# Patient Record
Sex: MALE | Race: WHITE
[De-identification: names, ages, dates, MRNs, and addresses within clinical notes are randomized per-mention and may not be internally consistent; named-entity substitution may affect disease eponyms.]

---

## 2017-01-23 ENCOUNTER — HOSPITAL ENCOUNTER (OUTPATIENT)
Dept: HOSPITAL 80 - BMCIMAGING | Age: 82
End: 2017-01-23
Attending: EMERGENCY MEDICINE
Payer: COMMERCIAL

## 2017-01-23 DIAGNOSIS — M51.36: ICD-10-CM

## 2017-01-23 DIAGNOSIS — M25.551: Primary | ICD-10-CM
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
## 2017-01-23 DIAGNOSIS — M19.011: ICD-10-CM

## 2017-01-23 NOTE — DX
Right shoulder 3 views



History: Pain, fall 3 months ago.



Comparison: None available.



Findings: Old healed right posterolateral fourth rib fracture is noted. Moderate acromioclavicular ar
throsis is present. Alignment of the glenohumeral joint is normal. The acromioclavicular and coracocl
avicular relationships are normal.



Impression:

1. Moderate acromioclavicular arthrosis.

2. Old right fourth rib fracture.

## 2017-01-23 NOTE — DX
Right hip 2 views



History: Fall 3 weeks ago, pain.



Comparison: Lumbar spine November 12, 2014. 



Findings: Bilateral hip arthroplasties are present without evidence of loosening. There is lucency th
rough hypertrophic change in the region of the right greater trochanter, which could be related to fr
acture or heterotopic ossification. The greater trochanter was outside the field-of-view on the noelle
rison MR lumbar spine. Alignment is normal. Severe degenerative change is present in the visualized l
umbar spine. Surgical clips overlie the inferior pelvis.



Impression:

1. Lucency through the right greater trochanter, which may be related to fracture or heterotopic ossi
fication, with normal appearance of the right hip arthroplasty.

2. Severe degenerative change in the lumbar spine.

## 2018-10-11 ENCOUNTER — HOSPITAL ENCOUNTER (OUTPATIENT)
Dept: HOSPITAL 80 - FIMAGING | Age: 83
End: 2018-10-11
Attending: FAMILY MEDICINE
Payer: COMMERCIAL

## 2018-10-11 DIAGNOSIS — H61.23: ICD-10-CM

## 2018-10-11 DIAGNOSIS — R90.82: ICD-10-CM

## 2018-10-11 DIAGNOSIS — G31.9: Primary | ICD-10-CM

## 2018-10-11 PROCEDURE — 70470 CT HEAD/BRAIN W/O & W/DYE: CPT

## 2019-01-25 ENCOUNTER — HOSPITAL ENCOUNTER (EMERGENCY)
Dept: HOSPITAL 80 - FED | Age: 84
Discharge: HOME | End: 2019-01-25
Payer: COMMERCIAL

## 2019-01-25 VITALS — DIASTOLIC BLOOD PRESSURE: 81 MMHG | SYSTOLIC BLOOD PRESSURE: 149 MMHG

## 2019-01-25 DIAGNOSIS — S62.666A: Primary | ICD-10-CM

## 2019-01-25 DIAGNOSIS — S06.9X0A: ICD-10-CM
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
## 2019-01-25 DIAGNOSIS — I10: ICD-10-CM

## 2019-01-25 DIAGNOSIS — I25.2: ICD-10-CM

## 2019-01-25 DIAGNOSIS — Z79.01: ICD-10-CM
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
## 2019-01-25 PROCEDURE — 70450 CT HEAD/BRAIN W/O DYE: CPT

## 2019-01-25 PROCEDURE — L3925 FO PIP DIP JNT/SPRNG PRE OTS: HCPCS

## 2019-01-25 PROCEDURE — 99284 EMERGENCY DEPT VISIT MOD MDM: CPT

## 2019-01-25 PROCEDURE — 73130 X-RAY EXAM OF HAND: CPT

## 2019-01-25 NOTE — EDPHY
HPI/HX/ROS/PE/MDM


Narrative: 





CHIEF COMPLAINT: Fall, finger injury





HPI: The patient is an anticoagulated 88 y/o male with a history of 

hypertension and MI who arrives with his family member complaining of right 

finger injury secondary to a mechanical slip and fall on the sidewalk one hour 

prior to arrival. He describes striking the back of his head on the sidewalk 

without associated LOC, weakness, paresthesias, or acute neck or back pain. He 

was able to stand up after the fall. He is primarily complaining of right 

little finger pain. He denies chest pain, dyspnea, abdominal pain, pelvis pain, 

weakness, paresthesias, or other extremity injuries. 





REVIEW OF SYSTEMS:


A comprehensive 10 system review of systems is otherwise negative aside from 

elements mentioned in the history of present illness.





PMH: Hypertension, MI - anticoagulant





SOCIAL HISTORY: Family member at bedside. Lives in [place]. . Retired. 





PHYSICAL EXAM:


General:Patient is alert, in no acute distress.


Head: Atraumatic.


ENT:Eyes are normal to inspection.  ENT inspection normal.


Neck: Normal inspection.  Full range of motion.


Respiratory:No respiratory distress.  Breath sounds normal bilaterally.


Cardiovascular: Regular rate and rhythm.  Strong peripheral pulses.  Normal cap 

refill.


Abdomen:The abdomen is nontender to palpation. There are no peritoneal signs. 


Back: Normal to inspection.  No tenderness to palpation.


Skin: Normal color.  No rash.  Warm and dry.


Extremities: Ecchymosis and skin tear volar aspect of right 5th finger at DIP 

joint, otherwise normal appearance.  Full range of motion.


Neuro: Oriented x3.  Normal motor function.  Normal sensory function.








ED Course: 


88 y/o male on anticoagulants presents for evaluation of head strike and right 

little finger injury secondary to mechanical slip and fall this afternoon. Exam 

is atraumatic apart from ecchymosis and skin tear of DIP joint on right little 

finger. Neuro exam is nonfocal. Due to anticoagulation and age, plan for head 

CT for evaluation of intracranial injury in addition to finger x-ray. 





Head CT: negative





Finger x-ray: nondisplaced fracture of distal phalanx





Reassessed patient and discussed findings. Exam remains unchanged. Alumafoam 

splint applied to finger. Patient will bed discharged with standard care and 

follow up instructions. Referral to hand specialist provided. Return 

precautions discussed. He and his family are comfortable with plan for 

discharge. 





- Data Points


Imaging Results: 


 Imaging Impressions





Hand X-Ray  01/25/19 15:44


Impression: Nondisplaced fracture involving the base of the 5th distal phalanx.








Head CT  01/25/19 15:45


Impression:    


1. Stable moderate atrophy.


2. No hemorrhage, mass effect, or definite acute peripheral infarct.


3.  Stable moderate nonspecific hypodensities in the white matter of bilateral 

cerebral hemispheres. Differential diagnosis includes microvascular ischemic 

disease, post-infectious/post-inflammatory sequela, atypical demyelinating 

disease, or migraine-related sequela. Small white matter lacunar infarcts may 

also have this appearance.


 


 


If symptoms worsen, additional imaging may be necessary.


 


 


 











Imaging: Discussed imaging studies w/ On call Radiologist, I viewed and 

interpreted images myself





General


Time Seen by Provider: 01/25/19 15:29


Initial Vital Signs: 


 Initial Vital Signs











Temperature (C)  36.5 C   01/25/19 15:34


 


Heart Rate  73   01/25/19 15:34


 


Respiratory Rate  18   01/25/19 15:34


 


Blood Pressure  136/84 H  01/25/19 15:34


 


O2 Sat (%)  96   01/25/19 15:34








 











O2 Delivery Mode               Room Air














Allergies/Adverse Reactions: 


 





No Known Allergies Allergy (Unverified 01/25/19 15:33)


 








Home Medications: 














 Medication  Instructions  Recorded


 


Unknown Blood Thinner  01/25/19














Departure





- Departure


Disposition: Home, Routine, Self-Care


Clinical Impression: 


Fall


Qualifiers:


 Encounter type: initial encounter Qualified Code(s): W19.XXXA - Unspecified 

fall, initial encounter





Finger fracture


Qualifiers:


 Encounter type: initial encounter Finger: little finger Fracture type: closed 

Phalanx: distal Fracture alignment: nondisplaced Laterality: right Qualified 

Code(s): S62.666A - Nondisplaced fracture of distal phalanx of right little 

finger, initial encounter for closed fracture





Condition: Good


Instructions:  Finger Fracture (ED), Fall Prevention for Older Adults (ED)


Additional Instructions: 


1. Wear splint until follow up appointment. 


2. Tylenol as directed on the packaging as needed for pain for the next few 

days. You can also apply ice packs to sore areas intermittently for the next 1-

2 days if helpful for pain. 


3. Follow up with hand specialist in the next week for reevaluation.


4. Return for worsening of condition. 


Referrals: 


Bryan Ramirez MD [Primary Care Provider] - As per Instructions


Maxi Kwong MD [Medical Doctor] - As per Instructions


Report Scribed for: Quincy Hurley


Report Scribed by: Yudy Sloan


Date of Report: 01/25/19


Time of Report: 15:47


Physician Review and Approval Statement: Portions of this note were transcribed 

by an ED scribe.  I personally performed the history, physical exam, and 

medical decision making; and confirm the accuracy of the information in the 

transcribed note.

## 2019-03-12 ENCOUNTER — HOSPITAL ENCOUNTER (OUTPATIENT)
Dept: HOSPITAL 80 - FIMAGING | Age: 84
End: 2019-03-12
Attending: ORTHOPAEDIC SURGERY
Payer: COMMERCIAL

## 2019-03-12 DIAGNOSIS — T84.050A: Primary | ICD-10-CM

## 2019-03-12 DIAGNOSIS — Z96.643: ICD-10-CM

## 2019-03-27 ENCOUNTER — HOSPITAL ENCOUNTER (INPATIENT)
Dept: HOSPITAL 80 - FED | Age: 84
LOS: 5 days | Discharge: NURSING, REHAB OR LONG-TERM CARE | DRG: 71 | End: 2019-04-01
Attending: INTERNAL MEDICINE | Admitting: INTERNAL MEDICINE
Payer: COMMERCIAL

## 2019-03-27 DIAGNOSIS — I25.10: ICD-10-CM

## 2019-03-27 DIAGNOSIS — G93.40: Primary | ICD-10-CM

## 2019-03-27 DIAGNOSIS — I25.2: ICD-10-CM

## 2019-03-27 DIAGNOSIS — M79.606: ICD-10-CM
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
## 2019-03-27 DIAGNOSIS — Z95.5: ICD-10-CM

## 2019-03-27 DIAGNOSIS — F03.90: ICD-10-CM

## 2019-03-27 DIAGNOSIS — I10: ICD-10-CM

## 2019-03-27 DIAGNOSIS — E87.1: ICD-10-CM

## 2019-03-27 DIAGNOSIS — G45.9: ICD-10-CM

## 2019-03-27 DIAGNOSIS — Z96.643: ICD-10-CM

## 2019-03-27 LAB
INR PPP: 1.09 (ref 0.83–1.16)
PLATELET # BLD: 366 10^3/UL (ref 150–400)
PROTHROMBIN TIME: 13.7 SEC (ref 12–15)

## 2019-03-27 PROCEDURE — A9585 GADOBUTROL INJECTION: HCPCS

## 2019-03-27 PROCEDURE — G0378 HOSPITAL OBSERVATION PER HR: HCPCS

## 2019-03-27 RX ADMIN — HEPARIN SODIUM SCH UNIT: 5000 INJECTION, SOLUTION INTRAVENOUS; SUBCUTANEOUS at 23:11

## 2019-03-27 NOTE — PDGENHP
History and Physical





- Chief Complaint


Slurred Speech





- History of Present Illness


Brian Chong is a 86 yo M with a PMHx of MI, HTN who presents to [facility] for 

slurred speech.  His granddaughter is at bedside who has helped with history 

taking.  She reports that she spoke with him on the telephone around 8AM and he 

was normal.  Her mother then spoke with the patient around 7:30 PM and he had 

confusion and slurred speech.  He was then brought to ED. Pt is complaining of 

pain in his legs.  His granddaughter notes that he broke one of his legs 1 

month ago and has been in some pain since.





History Information





- Allergies/Home Medication List


Allergies/Adverse Reactions: 








No Known Allergies Allergy (Unverified 03/27/19 19:55)


 





Home Medications: 








Clopidogrel Bisulfate [Clopidogrel] 75 mg PO DAILY 03/27/19 [Last Taken Unknown]


Lisinopril [Zestril 5 mg (*)] 5 mg PO DAILY 03/27/19 [Last Taken Unknown]


Metoprolol Succinate 25 mg PO DAILY 03/27/19 [Last Taken Unknown]





I have personally reviewed and updated: family history, medical history, social 

history, surgical history





- Past Medical History


hypertension, myocardial infarction





- Surgical History


Additional surgical history: Hip replacement





- Family History


Positive for: non-pertinent





- Social History


Smoking Status: Never smoked





Review of Systems


Review of Systems: 


Unable to obtain due to AMS





Physical Exam


Physical Exam: 

















Temp Pulse Resp BP Pulse Ox


 


 37.2 C   62   18   133/70 H  95 


 


 03/27/19 19:53  03/27/19 19:53  03/27/19 19:53  03/27/19 19:53  03/27/19 19:53











Constitutional: no apparent distress, chronically ill appearing


Eyes: PERRL


Ears, Nose, Mouth, Throat: dry mucous membranes


Cardiovascular: regular rate and rhythym


Respiratory: no respiratory distress


Gastrointestinal: soft, non-tender abdomen


Musculoskeletal: generalized weakness


Neurologic: No AAOx3





Lab Data & Imaging Review





 03/27/19 20:20





 03/27/19 20:20














WBC  12.61 10^3/uL (3.80-9.50)  H  03/27/19  20:20    


 


RBC  4.12 10^6/uL (4.40-6.38)  L  03/27/19  20:20    


 


Hgb  12.8 g/dL (13.7-17.5)  L  03/27/19  20:20    


 


POC Hgb  13.3 gm/dL (13.7-17.5)  L  03/27/19  20:24    


 


Hct  38.0 % (40.0-51.0)  L  03/27/19  20:20    


 


POC Hct  39 % (40-51)  L  03/27/19  20:24    


 


MCV  92.2 fL (81.5-99.8)   03/27/19  20:20    


 


MCH  31.1 pg (27.9-34.1)   03/27/19  20:20    


 


MCHC  33.7 g/dL (32.4-36.7)   03/27/19  20:20    


 


RDW  13.2 % (11.5-15.2)   03/27/19  20:20    


 


Plt Count  366 10^3/uL (150-400)   03/27/19  20:20    


 


MPV  10.2 fL (8.7-11.7)   03/27/19  20:20    


 


Neut % (Auto)  Not Reported   03/27/19  20:20    


 


Lymph % (Auto)  Not Reported   03/27/19  20:20    


 


Mono % (Auto)  Not Reported   03/27/19  20:20    


 


Eos % (Auto)  Not Reported   03/27/19  20:20    


 


Baso % (Auto)  Not Reported   03/27/19  20:20    


 


Nucleat RBC Rel Count  Not Reported   03/27/19  20:20    


 


Absolute Neuts (auto)  Not Reported   03/27/19  20:20    


 


Absolute Lymphs (auto)  Not Reported   03/27/19  20:20    


 


Absolute Monos (auto)  Not Reported   03/27/19  20:20    


 


Absolute Eos (auto)  Not Reported   03/27/19  20:20    


 


Absolute Basos (auto)  Not Reported   03/27/19  20:20    


 


Absolute Nucleated RBC  Not Reported   03/27/19  20:20    


 


Immature Gran %  Not Reported   03/27/19  20:20    


 


Seg Neutrophils %  64.0 %  03/27/19  20:20    


 


Band Neutrophils %  0.0 %  03/27/19  20:20    


 


Lymphocytes %  15.0 %  03/27/19  20:20    


 


Monocytes %  16.0 %  03/27/19  20:20    


 


Eosinophils %  3.0 %  03/27/19  20:20    


 


Basophils %  2.0 %  03/27/19  20:20    


 


Metamyelocytes %  0.0 %  03/27/19  20:20    


 


Myelocytes %  0.0 %  03/27/19  20:20    


 


Promyelocytes %  0.0 %  03/27/19  20:20    


 


Blast Cells %  0.0 %  03/27/19  20:20    


 


Immature Gran #  Not Reported   03/27/19  20:20    


 


Absolute Seg Neuts  8.07 10^3/uL (1.70-6.50)  H  03/27/19  20:20    


 


Absolute Band Neuts  0.00 10^3/uL (0.00-0.70)   03/27/19  20:20    


 


Absolute Lymphocytes  1.89 10^3/uL (1.00-3.00)   03/27/19  20:20    


 


Absolute Monocytes  2.02 10^3/uL (0.30-0.80)  H  03/27/19  20:20    


 


Absolute Eosinophils  0.38 10^3/uL (0.03-0.40)   03/27/19  20:20    


 


Absolute Basophils  0.25 10^3/uL (0.02-0.10)  H  03/27/19  20:20    


 


Absolute Metamyelocyte  0.00 10^3/mL (0.00-0.00)   03/27/19  20:20    


 


Absolute Myelocytes  0.00 10^3/mL (0.00-0.00)   03/27/19  20:20    


 


Absolute Promyelocytes  0.00 10^3/uL (0.00-0.00)   03/27/19  20:20    


 


Absolute Plasma Cells  0.00 10^3/uL (0.00-0.00)   03/27/19  20:20    


 


Nucleated RBCs  0 /100 WBC (0-0)   03/27/19  20:20    


 


Absolute Blast Cells  0.00 10^3/uL (0.00-0.00)   03/27/19  20:20    


 


Plasma Cells %  0.0 %  03/27/19  20:20    


 


Platelet Estimate  ADEQUATE  (ADEQ)   03/27/19  20:20    


 


Oval Macrocytes  1+  H  03/27/19  20:20    


 


PT  13.7 SEC (12.0-15.0)   03/27/19  20:20    


 


INR  1.09  (0.83-1.16)   03/27/19  20:20    


 


POC Sodium  134 mEq/L (135-145)  L  03/27/19  20:24    


 


Sodium  128 mEq/L (135-145)  L  03/27/19  20:20    


 


POC Potassium  4.2 mEq/L (3.3-5.0)   03/27/19  20:24    


 


Potassium  4.3 mEq/L (3.5-5.2)   03/27/19  20:20    


 


POC Chloride  100 mEq/L ()   03/27/19  20:24    


 


Chloride  99 mEq/L ()   03/27/19  20:20    


 


Carbon Dioxide  23 mEq/l (22-31)   03/27/19  20:20    


 


POC Total CO2  21 mEq/L (22-31)  L  03/27/19  20:24    


 


Anion Gap  6 mEq/L (6-14)   03/27/19  20:20    


 


POC BUN  20 mg/dL (7-23)   03/27/19  20:24    


 


BUN  21 mg/dL (7-23)   03/27/19  20:20    


 


Creatinine  1.2 mg/dL (0.7-1.3)   03/27/19  20:20    


 


POC Creatinine  1.2 mg/dL (0.7-1.3)   03/27/19  20:24    


 


Estimated GFR  57   03/27/19  20:20    


 


Glucose  92 mg/dL ()   03/27/19  20:20    


 


POC Glucose  96 mg/dL ()   03/27/19  20:24    


 


Calcium  8.7 mg/dL (8.5-10.4)   03/27/19  20:20    











Assessment & Plan


Assessment: 


Altered Mental Status


- Concerning for CVA given slurred speech


- CT Head w/o IVC on admission w/o acute abnormalities


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 


- S/p ASA in the ED, already on Plavix


- Out of tPA window, unknown onset of symptoms


- MRI Brain ordered


- TTE bubble study ordered


- Lipid panel ordered


- Neurology consult placed for the AM


- PT/OT/SLP ordered





Leg Pain


- Recent femur fracture which in non-operative per granddaughter


- Xrays are currently pending


- PRN pain control 


- PT/OT ordered





Hyponatremia


- Na 128 on admission, ()


- Likely hypovolemic


- S/p IVF, repeat Na in the AM





MI


- Continue home meds pending med rec





HTN


- Continue home Lisinopril pending med rec 





FEN: IVF


DVT PPx: Lovenox 


Code: FULL, discussed with granddaughter, she has to discuss with her parents 

who are POA 





Dispo: Admit to Observation

## 2019-03-27 NOTE — CPEKG
Test Reason : OPEN

Blood Pressure : ***/*** mmHG

Vent. Rate : 067 BPM     Atrial Rate : 066 BPM

   P-R Int : 218 ms          QRS Dur : 086 ms

    QT Int : 389 ms       P-R-T Axes : -22 024 071 degrees

   QTc Int : 411 ms

 

Sinus rhythm

Borderline prolonged LA interval

Consider left ventricular hypertrophy

Anterior Q waves, possibly due to LVH

 

Confirmed by Jane Grant ([id number]) on 3/27/2019 9:22:37 PM

 

Referred By: JANE GRANT           Confirmed By:Jane Grant

## 2019-03-27 NOTE — EDPHY
H & P


Time Seen by Provider: 03/27/19 20:04


HPI/ROS: 





Chief complaint.  Painful legs





HPI.  87-year-old male when I go into the room is having speech difficulty.  

His granddaughter is here and says that she saw him at 8:00 a.m. This morning 

and he was normal.  His speech was normal and he was having normal mentation.  

She went to check on him at 7:20 this evening and found the patient to be 

confused and having trouble speaking.  She brought him to the emergency 

department and the complaint was painful legs.  Again when I went to  he was 

clearly having difficulty speaking and I have called a stroke activation.  

Patient lives independently in assisted living and as far as we know no one has 

seen the patient between 8:00 a.m. And 7:20 p.m. So we do not know the onset of 

his speech difficulty.  Granddaughter tells me that he may have a break in the 

bones of 1 of his legs.  The patient is having a hard time telling me whether 

there has been recent fall or trauma.  As far as I can tell no chest pain or 

trouble breathing.  No abdominal pain.





ROS


10 systems were reviewed and negative with the exception of the elements 

mentioned in the history of present illness





Past Medical/Surgical History: 





Hypertension, recent femur fracture, MI


Social History: 





Single, nonsmoker, no alcohol


Smoking Status: Never smoked


Physical Exam: 





General Appearance:  Alert well-developed male moderate distress vital signs 

are stable


Eyes: Pupils equal and round no pallor or injection.


ENT, Mouth:  Mucous membranes are moist.


Respiratory:  There are no retractions, lungs are clear to auscultation.


Cardiovascular: Regular rate and rhythm.


Gastrointestinal:   Abdomen is soft and nontender, no masses, bowel sounds 

normal.


Neurological:  Awake and alert, sensory and motor exams grossly normal.  Speech 

is garbled.  Patient is confused.  Cranial nerves appear intact possibly slight 

right mouth droop.  Finger-to-nose is somewhat ataxic with both hands and 

patient has a hard time following commands.  Appears to be some weakness in his 

right leg


Skin: Warm and dry, no rashes.


Musculoskeletal:  Neck is supple nontender.


Extremities  symmetrical, full range of motion.  Pain both hips


Psychiatric: Patient is oriented X 3, there is no agitation.


Constitutional: 


 Initial Vital Signs











Temperature (C)  37.2 C   03/27/19 19:53


 


Heart Rate  62   03/27/19 19:53


 


Respiratory Rate  18   03/27/19 19:53


 


Blood Pressure  133/70 H  03/27/19 19:53


 


O2 Sat (%)  95   03/27/19 19:53








 











O2 Delivery Mode               Room Air














Allergies/Adverse Reactions: 


 





No Known Allergies Allergy (Unverified 03/27/19 19:55)


 








Home Medications: 














 Medication  Instructions  Recorded


 


Clopidogrel Bisulfate [Clopidogrel] 75 mg PO DAILY 03/27/19


 


Lisinopril [Zestril 5 mg (*)] 5 mg PO DAILY 03/27/19


 


Metoprolol Succinate 25 mg PO DAILY 03/27/19














Medical Decision Making





- Diagnostics


EKG Interpretation: 





EKG interpreted by me shows normal sinus rhythm normal interval.  Normal axis.  

QRS is normal.  Anterior Q-waves.  No significant ST elevation or depression.  

No arrhythmia.  Rate 67


Imaging Results: 


 Imaging Impressions





Head CT  03/27/19 20:27


Impression: Elderly head CT. Nothing acute identified..


 


Initial results discussed with Dr. Grant with the patient on the CT table. 

Final results are concordant with the initial interpretation at 20:58 PM.


 


General information for patients regarding this examination can be found at 

[email or web address]


 


If you have questions or comments about this report, please contact me at [phone] (hospital) or [phone] (cell). 








Noncontrast head CT negative.  Reviewed by me and discussed with Dr. Oppenheimer





Perfusion studies show a normal CT angiogram of his head.  There is a flow-

limiting proximal right vertebral artery lesion.  Carotids are normal.


Procedures: 





IV normal saline monitor


ED Course/Re-evaluation: 





The patient is out of treatment window for tPA as last time known normal was 8:

00 a.m..  He does not have a large vessel occlusion that would require 

thrombectomy.  X-rays of his pelvis and legs are pending and currently being 

done





I consulted discussed case with Dr. Salgado, hospitalist who agrees to the 

admission


Differential Diagnosis: 





Appears the patient has had CVA.  Out of treatment window.  Plan is admission 

for stroke workup





- Data Points


Laboratory Results: 


 Laboratory Results





 03/27/19 20:20 





 03/27/19 20:20 





 











  03/27/19 03/27/19 03/27/19





  20:24 20:20 20:20


 


WBC      





    


 


RBC      





    


 


Hgb      





    


 


POC Hgb  13.3 gm/dL L gm/dL    





   (13.7-17.5)   


 


Hct      





    


 


POC Hct  39 % L %    





   (40-51)   


 


MCV      





    


 


MCH      





    


 


MCHC      





    


 


RDW      





    


 


Plt Count      





    


 


MPV      





    


 


Neut % (Auto)      





    


 


Lymph % (Auto)      





    


 


Mono % (Auto)      





    


 


Eos % (Auto)      





    


 


Baso % (Auto)      





    


 


Nucleat RBC Rel Count      





    


 


Absolute Neuts (auto)      





    


 


Absolute Lymphs (auto)      





    


 


Absolute Monos (auto)      





    


 


Absolute Eos (auto)      





    


 


Absolute Basos (auto)      





    


 


Absolute Nucleated RBC      





    


 


Immature Gran %      





    


 


Seg Neutrophils %      





    


 


Band Neutrophils %      





    


 


Lymphocytes %      





    


 


Monocytes %      





    


 


Eosinophils %      





    


 


Basophils %      





    


 


Metamyelocytes %      





    


 


Myelocytes %      





    


 


Promyelocytes %      





    


 


Blast Cells %      





    


 


Immature Gran #      





    


 


Absolute Seg Neuts      





    


 


Absolute Band Neuts      





    


 


Absolute Lymphocytes      





    


 


Absolute Monocytes      





    


 


Absolute Eosinophils      





    


 


Absolute Basophils      





    


 


Absolute Metamyelocyte      





    


 


Absolute Myelocytes      





    


 


Absolute Promyelocytes      





    


 


Absolute Plasma Cells      





    


 


Nucleated RBCs      





    


 


Absolute Blast Cells      





    


 


Plasma Cells %      





    


 


Platelet Estimate      





    


 


Oval Macrocytes      





    


 


PT      13.7 SEC SEC





     (12.0-15.0) 


 


INR      1.09 





     (0.83-1.16) 


 


POC Sodium  134 mEq/L L mEq/L    





   (135-145)   


 


Sodium    128 mEq/L L mEq/L  





    (135-145)  


 


POC Potassium  4.2 mEq/L mEq/L    





   (3.3-5.0)   


 


Potassium    4.3 mEq/L mEq/L  





    (3.5-5.2)  


 


POC Chloride  100 mEq/L mEq/L    





   ()   


 


Chloride    99 mEq/L mEq/L  





    ()  


 


Carbon Dioxide    23 mEq/l mEq/l  





    (22-31)  


 


POC Total CO2  21 mEq/L L mEq/L    





   (22-31)   


 


Anion Gap    6 mEq/L mEq/L  





    (6-14)  


 


POC BUN  20 mg/dL mg/dL    





   (7-23)   


 


BUN    21 mg/dL mg/dL  





    (7-23)  


 


Creatinine    1.2 mg/dL mg/dL  





    (0.7-1.3)  


 


POC Creatinine  1.2 mg/dL mg/dL    





   (0.7-1.3)   


 


Estimated GFR    57   





    


 


Glucose    92 mg/dL mg/dL  





    ()  


 


POC Glucose  96 mg/dL mg/dL    





   ()   


 


Calcium    8.7 mg/dL mg/dL  





    (8.5-10.4)  














  03/27/19





  20:20


 


WBC  12.61 10^3/uL H 10^3/uL





   (3.80-9.50) 


 


RBC  4.12 10^6/uL L 10^6/uL





   (4.40-6.38) 


 


Hgb  12.8 g/dL L g/dL





   (13.7-17.5) 


 


POC Hgb  





  


 


Hct  38.0 % L %





   (40.0-51.0) 


 


POC Hct  





  


 


MCV  92.2 fL fL





   (81.5-99.8) 


 


MCH  31.1 pg pg





   (27.9-34.1) 


 


MCHC  33.7 g/dL g/dL





   (32.4-36.7) 


 


RDW  13.2 % %





   (11.5-15.2) 


 


Plt Count  366 10^3/uL 10^3/uL





   (150-400) 


 


MPV  10.2 fL fL





   (8.7-11.7) 


 


Neut % (Auto)  Not Reported 





  


 


Lymph % (Auto)  Not Reported 





  


 


Mono % (Auto)  Not Reported 





  


 


Eos % (Auto)  Not Reported 





  


 


Baso % (Auto)  Not Reported 





  


 


Nucleat RBC Rel Count  Not Reported 





  


 


Absolute Neuts (auto)  Not Reported 





  


 


Absolute Lymphs (auto)  Not Reported 





  


 


Absolute Monos (auto)  Not Reported 





  


 


Absolute Eos (auto)  Not Reported 





  


 


Absolute Basos (auto)  Not Reported 





  


 


Absolute Nucleated RBC  Not Reported 





  


 


Immature Gran %  Not Reported 





  


 


Seg Neutrophils %  64.0 % %





  


 


Band Neutrophils %  0.0 % %





  


 


Lymphocytes %  15.0 % %





  


 


Monocytes %  16.0 % %





  


 


Eosinophils %  3.0 % %





  


 


Basophils %  2.0 % %





  


 


Metamyelocytes %  0.0 % %





  


 


Myelocytes %  0.0 % %





  


 


Promyelocytes %  0.0 % %





  


 


Blast Cells %  0.0 % %





  


 


Immature Gran #  Not Reported 





  


 


Absolute Seg Neuts  8.07 10^3/uL H 10^3/uL





   (1.70-6.50) 


 


Absolute Band Neuts  0.00 10^3/uL 10^3/uL





   (0.00-0.70) 


 


Absolute Lymphocytes  1.89 10^3/uL 10^3/uL





   (1.00-3.00) 


 


Absolute Monocytes  2.02 10^3/uL H 10^3/uL





   (0.30-0.80) 


 


Absolute Eosinophils  0.38 10^3/uL 10^3/uL





   (0.03-0.40) 


 


Absolute Basophils  0.25 10^3/uL H 10^3/uL





   (0.02-0.10) 


 


Absolute Metamyelocyte  0.00 10^3/mL 10^3/mL





   (0.00-0.00) 


 


Absolute Myelocytes  0.00 10^3/mL 10^3/mL





   (0.00-0.00) 


 


Absolute Promyelocytes  0.00 10^3/uL 10^3/uL





   (0.00-0.00) 


 


Absolute Plasma Cells  0.00 10^3/uL 10^3/uL





   (0.00-0.00) 


 


Nucleated RBCs  0 /100 WBC /100 WBC





   (0-0) 


 


Absolute Blast Cells  0.00 10^3/uL 10^3/uL





   (0.00-0.00) 


 


Plasma Cells %  0.0 % %





  


 


Platelet Estimate  ADEQUATE 





   (ADEQ) 


 


Oval Macrocytes  1+  H 





  


 


PT  





  


 


INR  





  


 


POC Sodium  





  


 


Sodium  





  


 


POC Potassium  





  


 


Potassium  





  


 


POC Chloride  





  


 


Chloride  





  


 


Carbon Dioxide  





  


 


POC Total CO2  





  


 


Anion Gap  





  


 


POC BUN  





  


 


BUN  





  


 


Creatinine  





  


 


POC Creatinine  





  


 


Estimated GFR  





  


 


Glucose  





  


 


POC Glucose  





  


 


Calcium  





  











Medications Given: 


 





Sodium Chloride (Ns)  1,000 mls @ 500 mls/hr IV EDNOW ONE


   PRN Reason: Protocol


   Stop: 03/27/19 22:25


   Last Admin: 03/27/19 20:56 Dose:  1,000 mls





Point of Care Test Results: 


 Chemistry











  03/27/19





  20:24


 


POC Sodium  134 mEq/L L mEq/L





   (135-145) 


 


POC Potassium  4.2 mEq/L mEq/L





   (3.3-5.0) 


 


POC Chloride  100 mEq/L mEq/L





   () 


 


POC Total CO2  21 mEq/L L mEq/L





   (22-31) 


 


POC BUN  20 mg/dL mg/dL





   (7-23) 


 


POC Creatinine  1.2 mg/dL mg/dL





   (0.7-1.3) 


 


POC Glucose  96 mg/dL mg/dL





   () 








 ISTAT H&H











  03/27/19





  20:24


 


POC Hgb  13.3 gm/dL L gm/dL





   (13.7-17.5) 


 


POC Hct  39 % L %





   (40-51) 














Departure





- Departure


Disposition: [facility]


Clinical Impression: 


CVA (cerebral vascular accident)


Qualifiers:


 CVA mechanism: unspecified Qualified Code(s): I63.9 - Cerebral infarction, 

unspecified





Condition: Fair


Referrals: 


NONE *PRIMARY CARE P,. [Primary Care Provider] - As per Instructions

## 2019-03-28 RX ADMIN — ACETAMINOPHEN PRN MG: 325 TABLET ORAL at 20:37

## 2019-03-28 RX ADMIN — HEPARIN SODIUM SCH UNIT: 5000 INJECTION, SOLUTION INTRAVENOUS; SUBCUTANEOUS at 20:38

## 2019-03-28 RX ADMIN — CLOPIDOGREL BISULFATE SCH MG: 75 TABLET, FILM COATED ORAL at 08:53

## 2019-03-28 RX ADMIN — HEPARIN SODIUM SCH UNIT: 5000 INJECTION, SOLUTION INTRAVENOUS; SUBCUTANEOUS at 15:48

## 2019-03-28 RX ADMIN — METOPROLOL SUCCINATE SCH MG: 25 TABLET, EXTENDED RELEASE ORAL at 08:54

## 2019-03-28 RX ADMIN — LISINOPRIL SCH MG: 5 TABLET ORAL at 08:54

## 2019-03-28 RX ADMIN — HEPARIN SODIUM SCH UNIT: 5000 INJECTION, SOLUTION INTRAVENOUS; SUBCUTANEOUS at 06:09

## 2019-03-28 NOTE — ASMTCMCOM
CM Note

 

CM Note                       

Notes:

Chart Review for Discharge Planning: 



Patient is an 87 year old male who presented to [facility] ED reporting leg pain and presented with 

difficulty speaking/slurred speech and aphasia.  Patient presented to ED with Grand 

daughter. Medical history includes dementia, hypertension, Myocardial infarction, recent femur 

fracture. Patient currently lives at [facility]. 



 OT ordered, recommending longterm/home with 24 hour supervision, PT ordered & recommending SNF, SLP 

ordered & recommending memory care. 

 

CM call to Grand daughter Tiffany, [phone], she shares he lives at [facility] and has a caregiver 


that visits 8-10 daily and he also attends adult day services M-F-F. He has PT with [facility] on 

Thursdays.  She shares she would be open to him staying with her when he discharges but is not 

around very much and is very busy. Tiffany states she does not know much about her Grandfathers 

medical history & recommended calling her mom, Shey on her dad's cell phone [phone], who is 

currently in [facility] on a trip. 



CM spoke with daughter Shey, CM shared recommendations for SNF, she states he doesn't have history 

of going to a SNF and though she agrees and thinks it is a good plan, she does not think he will 

agree and will want to go home. Shey shares she is planning on getting him into an Assisted Living 

Facility when she returns from [facility] in a month. She states she would like to speak with his MD 


about the Neuro consult and imaging results. 



CM call to Tg with [facility] [email or web address], [phone]. MONIQUE stating we are monitoring patient 

and will send updates as we have them. 



*Jagjit Kat in [facility] for one month, can be reached at [phone].



Plan: Neuro recommended MRI of brain and TTE. CM to follow. 



D/C Plan: TBD

 

Date Signed:  03/28/2019 04:58 PM

Electronically Signed By:Ivonne Patricio

## 2019-03-28 NOTE — GCON
[f rep st]



                                                                    CONSULTATION





NEUROLOGIC CONSULTATION



REFERRING PHYSICIAN:  Isra Salgado DO



HISTORY:  The patient is an 87-year-old gentleman I am asked to see in neurologic consultation regard
ing possible stroke.  The patient's daughter is in [place], but I spoke to her on the phone.  She s
ays that he has had progressing dementia for several years and that is getting worse, and she is used
 to him having trouble with some of his language in verbal expression, but there was a rather signifi
cant change yesterday when he was almost "speaking a foreign language."  He had apparently been doing
 fine in the morning, but by 8 p.m., it was clear that there was something not right with his verbal 
expression, and either slurring of speech or some aphasia was being described.  He came to the [facility]
al for evaluation, and due to the lack of knowledge of true onset, was not a candidate for tPA.  He h
ad a head CT that showed no hemorrhage or acute pathology.  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
enosis, but no large vessel occlusions or evidence of an acute stroke with vascular occlusion in any 
particular vascular territory.  The patient has continued to have some trouble with his communication
, but how much of this is different from his baseline is a little hard to say.  He denies any headach
e.  In fact, he is amnestic for why he is even here.  He does not think there is anything wrong with 
his speech, and is very calm and pleasant, but does not recognize any problem.  He did not know that 
his family had him come to the hospital.



REVIEW OF SYSTEMS:  Negative chest pain, palpitations, or shortness of breath.  



On the additional past medical history, his daughter says that he had a stent about 5 years ago and h
as been on Plavix since that time.  He is treated for some hypertension with lisinopril and metoprolo
l.  He has had hip replacement.  He has recent recognition of a small fracture of the right femur wilmer
t does not require any surgical intervention.  He also had a fall a few months ago with an injury to 
finger.  He is .  He is retired from the [employer] and moved throughout most of his life, and 
currently lives at the [facility].



ALLERGIES:  There are no known drug allergies. 



Currently in the hospital, he is receiving Plavix, subcutaneous heparin, lisinopril, metoprolol, Zofr
an as needed.



PHYSICAL EXAMINATION:  VITAL SIGNS:  Blood pressure is 149/73.  Pulse of 65, respirations 16, tempera
ture 36.4.  GENERAL:  Well developed, in no acute distress.  EYES:  Clear.  NECK: Supple with no brui
ts or masses.  CARDIAC:  Regular rate and rhythm.  No murmur.



NEUROLOGIC EXAM:  He is awake and alert with fairly good attention.  His concentration is diminished.
  He has a hard time with detailed verbal communication, although generally conveys his ideas fairly 
effectively.  He has a tendency to confabulate or make some paraphasic errors.  He could not tell me 
the city or the state, and when I gave him a list, he did not recognize [place] as being the state [place] is in.  He will talk in general terms about his situation, but have a hard time with specificity.  
There is a fairly significant poverty of thought.  He did not know the month or the year, and started
 getting confused thinking that I was asking him his age. 



He could name objects.  He was able to read sentences and single words, and made mild errors only.  GISSEL boykin could look at a figure and interpret the basics, but some of the finer detail he needed guidance to
 recognize.  He can perform basic calculations and can spell.  Pupils are 2 mm and reactive.  Extraoc
ular movements are intact.  Normal facial sensation and strength.  Motor exam reveals normal muscle b
ulk and tone, 5/5 strength.  Palate elevates symmetrically.  Tongue protrudes midline.  Hearing is pr
eserved.  Sensation is preserved for temperature and light touch.  Reflexes 1+.  No ataxia on finger-
to-nose.



LABORATORY STUDIES:  As outlined, with white count of 12,000, hematocrit of 39%.  Normal INR.  Chemis
try:  Sodium of 134, LDL cholesterol 64, otherwise normal basic metabolic panel.  Urinalysis unremark
able. 



NIH Stroke Scale is 1.



IMPRESSION:  Total unit time of 70 minutes.  This patient has experienced a change in language consis
tent with aphasia on top of a baseline advancing dementia where he also has some expressive language 
difficulties confirmed by his daughter.  Therefore, how much of this is truly new compared to his renay sheldon is a little hard to say.  However, his daughter confirms that what she heard yesterday was dram
atically different and not something she had seen before, so I suspect he had either a small stroke i
n the left hemisphere middle cerebral artery territory or a TIA, and may be close to his baseline aga
in.  Outside of the mild aphasia and the clear underlying dementia, he is comfortable.  No large vess
el occlusion is evident from the workup.  We will obtain brain MRI for further clarification of surinder
e, and echocardiogram will be obtained, as well.  Once we know for sure about the MRI, we can decide 
about adding aspirin to the Plavix, but I will continue the Plavix 75 mg for now.  We can also consid
er pros and cons of adding statin therapy, but generally would recommend statin therapy for secondary
 stroke prophylaxis even with a good LDL as he has.





Job #:  [id number]

## 2019-03-28 NOTE — ECHO
[email or web address]





[address] [address] 

Main: [phone] 



Echocardiography Examination 

Transthoracic 



Name:            RHODA HERNANDEZ                              MR#:

[id number]

Study Date:      03/28/2019                             Study Time:

07:41 AM

YOB: 1931                             Age:

87 year(s)

Height:          182.9 cm (72 in.)                      Weight:

77.11 kg (170 lb.)

BSA:             1.99 m2                                Gender:

Male

Examination:     Echo                                   Contrast: 

Image Quality:   Adequate                               Rhythm:

Normal sinus rhythm

Heart Rate:      63 bpm                                 BP:

/

Indication:      Ischemic Stroke, Confusion 



Procedure Staff 

Referring Physician: 

Echocardiographer:         Tylor Maciel RDCS 

Reading Physician:         Tyler Corbin MD 

Requesting Provider: 

Ordering Physician:         Isra Salgado  

Indication:                Ischemic Stroke, Confusion 



Measurements 

Chambers                                           AV/MV 

Label                 Value       Normal Value          Label

Value       Normal Value

EF lower range (%)      50 %                            AV PGmax

11 mmHg

EF upper range (%)      55 %                            AV PGmean

6 mmHg

IVSd, 2D                0.8 cm    (0.6cm - 1.1cm)       AV Vmax

1.66 m/s

LVDd, 2D                4.9 cm    (4.2cm - 5.9cm)       UIL

(continuity eq.  1.6 cm2

LVDs, 2D                3.5 cm    (2.1cm - 4cm)         Vmax) 

LVEF, 2D                54 %      (54% - 74%)           ULI D

(continuity eq. 2 cm2

LVOT PGmax              2 mmHg                          VTI) 

LVOT PGmean             1 mmHg                          MV A Vmax

0.68 m/s

LVOT Vmax               0.79 m/s  (0.7m/s - 1.1m/s)     MV E' lateral

0.06 m/s

LVOT Vmean              0.55 m/s                        MV E' mean

0.06 m/s

LVOTd                   2.1 cm    (1.9cm - 2.1cm)       MV E' septal

0.05 m/s

LVPWd, 2D               0.8 cm    (0.6cm - 1cm)         MV E Vmax

0.74 m/s

LA Volume, BP           61 ml     (18ml - 58ml)         MV E/A

1.09

LADs, 2D                3.2 cm    (3cm - 4cm)           MV E/E'

lateral      11.9

LAESV index, BP         30.7 ml/m2                      MV E/E' mean

13.45

Additional Vessels                                      MV E/E' septal

14.3 (0.45 - 1.25)

Label                 Value       Normal Value     TV/PV 

AoRoot, MM              2.6 cm    (2.2cm - 3.7cm)       Label

Value       Normal Value



RA Pressure          5 mmHg 



Patient: RHODA HERNANDEZ                           MRN: [id number]

Study Date: 03/28/2019   Page 1 of 2

07:41 AM 









RVSP 19 mmHg 

TR Pmax              14 mmHg 

TR Vmax              1.9 m/s 



Conclusions 



Overall Conclusions: 

 No pericardial effusion.  Anterior hypokinesis.  Ejection fraction

50-55%.  Injection of agitated saline revealed no

evidence of right to left shunting.  Right ventricular systolic

pressure is 19 mm of mercury.



Findings 



Left Ventricle: 

There is basilar to mid anteroseptal hypokinesis.. Left ventricle is

on the upper limits of normal. Low normal left

ventricular systolic function. EF range is estimated at 50 % - 55 %.

Left ventricle wall thickness is normal. Left

ventricular diastolic function parameters are normal.  

Right Ventricle: 

Normal size right ventricle. The RV function appears grossly normal.  

Left Atrium: 

The left atrium is normal in size.  

IAS: 

An agitated saline study was performed and was negative for

intracardiac shunting.

Right Atrium: 

The right atrium is normal in size.  

Mitral Valve: 

Trivial mitral regurgitation. No mitral valve stenosis. There is

minimal mitral calcification.

Aortic Valve: 

No aortic valve regurgitation. There is no aortic stenosis. Aortic

leaflets exhibit mild calcification. The aortic valve is not

visualized well enough to rule out a bicuspid morphology.  

Tricuspid Valve: 

Trivial tricuspid regurgitation. Right Ventricular systolic pressure

is measured at 19 mmHg. Pulmonary artery pressure

normal.  

Pulmonic Valve: 

Pulmonic leaflets are normal in appearance and function. No pulmonic

valve regurgitation is evident.

Aorta: 

The aorta is normal. The aortic root size in M-mode measures 2.6 cm.  

Aorta Measurements 

AoRoot, MM is 2.6 cm.  

Pericardium: 

No pericardial effusion.  



Exam Details 

Procedure Ordered:         Echo 

Procedure Status:          Routine study 

Image Quality:             Adequate 

Facility Location:         Cardiac Echo 1 



Electronically signed by Tyler Corbin MD on 03/28/2019 at 11:16 AM



(No Signature Object) 



Patient: RHODA HERNANDEZ                           MRN: [id number]

Study Date: 03/28/2019   Page 2 of 2

07:41 AM 







[id number]

## 2019-03-28 NOTE — HOSPPROG
Hospitalist Progress Note


Assessment/Plan: 


87 year old male with pmh of fairly advanced dementia admitted with slurred 

speech and aphasia





Slurred speech/AMS- concerning for CVA, although paitent denies any problem. 

CTA head and neck with no acute CVA. Neurology has been consulted who 

recommends MRI brain, and TTE. I reviewed the echo and find no explanation for 

his symptoms. He is already on plavix. 


-await brain MRI


-neurology graciously assisting. 


-cont plavix


-could consider statin





Leg Pain


- Recent femur fracture which in non-operative per granddaughter


- Xrays are currently pending


- PRN pain control 


- PT/OT ordered





Hyponatremia- suspect hypovolemic hyponatremia. increased to 134 overnight with 

intravenous saline. Will continue with low dose saline intravenous and repeat 

Na in am. if fails to correct will obtain urine studies. 





MI


- Continue home meds, including metoprolol, plavix and lisinopril. 





HTN


- Continue home ace and bb





FEN: IVF


DVT PPx: Lovenox 


Code: DNR per chart. 





Dispo: change to inpatient for continued eval of aphasia, slurred speech, MRI. 





Subjective: no complaints.


Objective: 


 Vital Signs











Temp Pulse Resp BP Pulse Ox


 


 36.7 C   65   18   151/73 H  94 


 


 03/28/19 11:45  03/28/19 11:45  03/28/19 11:45  03/28/19 11:45  03/28/19 11:45








 Laboratory Results





 03/28/19 04:27 





 











 03/27/19 03/28/19 03/29/19





 05:59 05:59 05:59


 


Intake Total  200 


 


Balance  200 








 











PT  13.7 SEC (12.0-15.0)   03/27/19  20:20    


 


INR  1.09  (0.83-1.16)   03/27/19  20:20    














- Physical Exam


Constitutional: no apparent distress, appears nourished, not in pain


Eyes: PERRL, anicteric sclera, EOMI


Ears, Nose, Mouth, Throat: moist mucous membranes, hearing normal, ears appear 

normal, no oral mucosal ulcers


Cardiovascular: regular rate and rhythym, no murmur, rub, or gallop


Respiratory: no respiratory distress, no rales or rhonchi, clear to auscultation


Gastrointestinal: normoactive bowel sounds, soft, non-tender abdomen, no 

palpable masses


Genitourinary: no bladder fullness, no bladder tenderness, no renal bruits


Skin: no rashes or abrasions, no fluctuance, no induration


Musculoskeletal: full muscle strength, no muscle tenderness, normal joint ROM


Neurologic: other (oriented to self only. Does not known year, city, state, or 

president. )


Psychiatric: interacting appropriately


Lymph, Heme, Immunologic: no cervical LAD





ICD10 Worksheet


Patient Problems: 


 Problems











Problem Status Onset


 


CVA (cerebral vascular accident) Acute

## 2019-03-29 LAB — PLATELET # BLD: 352 10^3/UL (ref 150–400)

## 2019-03-29 RX ADMIN — ACETAMINOPHEN PRN MG: 325 TABLET ORAL at 23:01

## 2019-03-29 RX ADMIN — METOPROLOL SUCCINATE SCH MG: 25 TABLET, EXTENDED RELEASE ORAL at 08:37

## 2019-03-29 RX ADMIN — HEPARIN SODIUM SCH: 5000 INJECTION, SOLUTION INTRAVENOUS; SUBCUTANEOUS at 16:03

## 2019-03-29 RX ADMIN — CLOPIDOGREL BISULFATE SCH MG: 75 TABLET, FILM COATED ORAL at 08:37

## 2019-03-29 RX ADMIN — HEPARIN SODIUM SCH UNIT: 5000 INJECTION, SOLUTION INTRAVENOUS; SUBCUTANEOUS at 04:44

## 2019-03-29 RX ADMIN — LISINOPRIL SCH MG: 5 TABLET ORAL at 08:37

## 2019-03-29 RX ADMIN — HEPARIN SODIUM SCH: 5000 INJECTION, SOLUTION INTRAVENOUS; SUBCUTANEOUS at 21:09

## 2019-03-29 NOTE — ASMTCMCOM
CM Note

 

CM Note                       

Notes:

Spoke with pt's daughter Shey (KATY) who is currently in [place] and will soon be out of phone 

range. She will be gone for a few weeks. Her brother Maxi is the alternate [facility] [phone] 

and should be contacted for medical decisions. Spoke with both of them regarding therapy 

recommendation for a short term rehab stay at d/c. They are both in agreement with the plan 

although not convinced it will benefit their father. Referrals were sent to [facility] (declined pt 

- concerned about him frequently getting up and down), [facility] and [facility] accepted. Earliest 

d/c would be 4/1 as pt was just changed from obs status to inpt today. Also spoke with Beulah the 


Exec Director at the [facility] and they are concerned about him returning there 2/2 being a high 

fall risk. It is independent living and pt is currently have difficulty getting around. CM will 

continue to follow. 



D/C plan: TBD, possibly SNF

 

Date Signed:  03/29/2019 03:26 PM

Electronically Signed By:JESSE Donohue

## 2019-03-29 NOTE — NEUROPROG
Assessment: 


total unit time of 15 minutes. Patient with advance dementia and may have had 

TIA or just the spectrum of his dementia with aphasia and confusion that is his 

reported baseline. Disposition in progress.


Subjective: 


The patient has not complaints


Objective: 





 Vital Signs











Temp Pulse Resp BP Pulse Ox


 


 36.5 C   81   26 H  186/84 H  98 


 


 03/29/19 08:00  03/29/19 08:00  03/29/19 08:00  03/29/19 08:00  03/29/19 08:00








 Laboratory Results





 03/29/19 04:19 





 03/29/19 04:19 





 











 03/28/19 03/29/19 03/30/19





 05:59 05:59 05:59


 


Intake Total 200 495 


 


Balance 200 495 








 











PT  13.7 SEC (12.0-15.0)   03/27/19  20:20    


 


INR  1.09  (0.83-1.16)   03/27/19  20:20    








Patient remains disoriented and has no insight on his limitations. MRI shows no 

acute stroke.


Allergies/Adverse Reactions: 


 





No Known Allergies Allergy (Unverified 03/27/19 19:55)

## 2019-03-29 NOTE — HOSPPROG
Hospitalist Progress Note


Assessment/Plan: 


87 year old male with pmh of fairly advanced dementia admitted with slurred 

speech and aphasia





Slurred speech/AMS- I read the MRI and there is no acute CVA/TIA. It is 

possible that he did have an underlying event but with his age, and level of 

dementia it is too difficult to discern. I reviewed neurology input and agree 

with holding off on dual antiplatelet on top of plavix.  CTA head and neck with 

no acute CVA. 


-neurology graciously assisting. 


-cont plavix


-add statin


-BP control





Leg Pain


- Recent femur fracture which in non-operative per granddaughter


- Xrays negative


- PRN pain control 


- PT/OT ordered





Hyponatremia- suspect hypovolemic hyponatremia. resolved with fluids. 





MI


- Continue home meds, including metoprolol, plavix and lisinopril. 





HTN


- on lisinopril 5mg, and lopressor. I increased his lisinopril to 10 today and 

added PRN hydralazine as he is still hypertensive. 





FEN: IVF


DVT PPx: Lovenox 


Code: DNR per chart. 





Dispo: remain inpatient for possible CVA/TIA. 





Subjective: no complaints. remains pleasantly confused.


Objective: 


 Vital Signs











Temp Pulse Resp BP Pulse Ox


 


 36.6 C   68   14   196/88 H  94 


 


 03/29/19 11:20  03/29/19 12:50  03/29/19 12:50  03/29/19 12:50  03/29/19 12:50








 











PT  13.7 SEC (12.0-15.0)   03/27/19  20:20    


 


INR  1.09  (0.83-1.16)   03/27/19  20:20    














- Physical Exam


Constitutional: no apparent distress


Eyes: PERRL


Ears, Nose, Mouth, Throat: moist mucous membranes


Cardiovascular: regular rate and rhythym


Respiratory: no respiratory distress


Gastrointestinal: normoactive bowel sounds


Genitourinary: no bladder fullness


Skin: warm, normal color


Musculoskeletal: full muscle strength


Neurologic: other (oriented to person only. )


Psychiatric: interacting appropriately


Lymph, Heme, Immunologic: no cervical LAD





ICD10 Worksheet


Patient Problems: 


 Problems











Problem Status Onset


 


CVA (cerebral vascular accident) Acute

## 2019-03-30 RX ADMIN — HEPARIN SODIUM SCH: 5000 INJECTION, SOLUTION INTRAVENOUS; SUBCUTANEOUS at 04:19

## 2019-03-30 RX ADMIN — HEPARIN SODIUM SCH UNIT: 5000 INJECTION, SOLUTION INTRAVENOUS; SUBCUTANEOUS at 14:07

## 2019-03-30 RX ADMIN — ACETAMINOPHEN PRN MG: 325 TABLET ORAL at 16:20

## 2019-03-30 RX ADMIN — METOPROLOL SUCCINATE SCH MG: 25 TABLET, EXTENDED RELEASE ORAL at 08:38

## 2019-03-30 RX ADMIN — ATORVASTATIN CALCIUM SCH MG: 10 TABLET, FILM COATED ORAL at 08:36

## 2019-03-30 RX ADMIN — HEPARIN SODIUM SCH UNIT: 5000 INJECTION, SOLUTION INTRAVENOUS; SUBCUTANEOUS at 21:27

## 2019-03-30 RX ADMIN — CLOPIDOGREL BISULFATE SCH MG: 75 TABLET, FILM COATED ORAL at 08:37

## 2019-03-30 NOTE — ASMTCMCOM
CM Note

 

CM Note                       

Notes:

CM spoke with pt's daughter and son.  Both are supportive of d/c to SNF; pt's tamiko requested 

[facility].  Will inform [facility] and ask them to run auth.



D/C Plan:  [facility]

 

Date Signed:  03/30/2019 11:02 AM

Electronically Signed By:Kareen Burgess

## 2019-03-30 NOTE — HOSPPROG
Hospitalist Progress Note


Assessment/Plan: 


87-year-old with advanced dementia admitted with slurred speech and aphasia.  

Evaluation and Neurology consult has not confirmed an obvious stroke and he 

appears to be getting close to baseline at this time, unfortunately he is not 

able to return to his usual living situation and will need skilled rehab for 

strengthening before returning to the [facility].





#  slurred speech, encephalopathy, reviewed neurology note and agree this could 

be dementia versus TIA


* Continue Plavix


* Add a statin to prevent further strokes


* Continue PT, will need rehab prior to returning to the [facility] at this time.





#  hypertension currently on lisinopril and Lopressor will monitor





#  CAD status post MI will continue his usual medications





#  leg pain with recent femur fracture which is non operative and negative x-

rays this admission.  Continue PT and OT as above.








Remains inpatient for possible stroke and multiple comorbidities including 

balance issues and high risk for fall.


Subjective: Patient new to me and chart reviewed.  Has significant advanced 

dementia, eager to continue walking but needs assistance.  Minimal pain


Objective: 


 Vital Signs











Temp Pulse Resp BP Pulse Ox


 


 36.5 C   73   20   146/85 H  98 


 


 03/30/19 07:51  03/30/19 11:51  03/30/19 11:51  03/30/19 11:51  03/30/19 11:51








 











 03/29/19 03/30/19 03/31/19





 05:59 05:59 05:59


 


Intake Total  300 900


 


Output Total  200 


 


Balance  100 900








 











PT  13.7 SEC (12.0-15.0)   03/27/19  20:20    


 


INR  1.09  (0.83-1.16)   03/27/19  20:20    














- Physical Exam


Constitutional: no apparent distress, not in pain, chronically ill appearing


Eyes: PERRL


Ears, Nose, Mouth, Throat: moist mucous membranes


Cardiovascular: regular rate and rhythym


Respiratory: no respiratory distress, clear to auscultation


Gastrointestinal: soft, non-tender abdomen


Genitourinary: no bladder fullness


Skin: normal color


Musculoskeletal: generalized weakness


Neurologic: No AAOx3, No facial droop


Psychiatric: poor insight





ICD10 Worksheet


Patient Problems: 


 Problems











Problem Status Onset


 


CVA (cerebral vascular accident) Acute

## 2019-03-30 NOTE — PDMN
Medical Necessity


Medical necessity: Change to IP, as of 3/29/19, per MD & MCG M-360; los >2 mn 

for ongoing management of possible TIA/CVA vs advanced dementia w/aphasia & 

confusion; requiring further monitoring & therapies; comorbid advanced age, 

recent non-operative femur fx

## 2019-03-31 RX ADMIN — ATORVASTATIN CALCIUM SCH MG: 10 TABLET, FILM COATED ORAL at 09:09

## 2019-03-31 RX ADMIN — METOPROLOL SUCCINATE SCH MG: 25 TABLET, EXTENDED RELEASE ORAL at 09:08

## 2019-03-31 RX ADMIN — LISINOPRIL SCH MG: 5 TABLET ORAL at 09:08

## 2019-03-31 RX ADMIN — HEPARIN SODIUM SCH UNIT: 5000 INJECTION, SOLUTION INTRAVENOUS; SUBCUTANEOUS at 21:46

## 2019-03-31 RX ADMIN — CLOPIDOGREL BISULFATE SCH MG: 75 TABLET, FILM COATED ORAL at 09:09

## 2019-03-31 RX ADMIN — HEPARIN SODIUM SCH: 5000 INJECTION, SOLUTION INTRAVENOUS; SUBCUTANEOUS at 05:12

## 2019-03-31 RX ADMIN — HEPARIN SODIUM SCH UNIT: 5000 INJECTION, SOLUTION INTRAVENOUS; SUBCUTANEOUS at 13:57

## 2019-03-31 RX ADMIN — ACETAMINOPHEN PRN MG: 325 TABLET ORAL at 13:57

## 2019-03-31 NOTE — HOSPPROG
Hospitalist Progress Note


Assessment/Plan: 


87-year-old with advanced dementia admitted with slurred speech and aphasia.  

Evaluation and Neurology consult has not confirmed an obvious stroke and he 

appears to be getting close to baseline at this time, unfortunately he is not 

able to return to his usual living situation and will need skilled rehab for 

strengthening before returning to the [facility].





#  slurred speech, encephalopathy, reviewed neurology note and agree this could 

be worsening dementia versus TIA.  MRI showed no stroke.  CTAs were negative 

and ECHO showed no clot.  No afib.


* Continue Plavix


* Add a statin to prevent further strokes


* Continue PT, will need rehab prior to returning to the [facility] at this time.





#  hypertension currently on lisinopril and Lopressor will monitor, has been 

elevated


* increase lisinopril to 20mg daily and follow





#  CAD status post MI will continue his usual medications





#  leg pain with recent femur fracture which is non operative and negative x-

rays this admission.  Continue PT and OT as above.








Remains inpatient for possible stroke and multiple comorbidities including 

balance issues and high risk for fall.


Dispo:  CM working on disposition SNF rehab since the [facility] is independent 

living and he may not be able to return there at this time. He was place 

inpatient on 3/29 and can transfer to [facility] on 4/1.   


Subjective: no pain


Objective: 


 Vital Signs











Temp Pulse Resp BP Pulse Ox


 


 36.3 C   74   14   127/81 H  98 


 


 03/31/19 07:48  03/31/19 10:51  03/31/19 07:48  03/31/19 10:51  03/31/19 07:48








 











 03/30/19 03/31/19 04/01/19





 05:59 05:59 05:59


 


Intake Total 300 1500 


 


Output Total 200  


 


Balance 100 1500 








 











PT  13.7 SEC (12.0-15.0)   03/27/19  20:20    


 


INR  1.09  (0.83-1.16)   03/27/19  20:20    














- Physical Exam


Constitutional: no apparent distress


Respiratory: no respiratory distress


Neurologic: No AAOx3


Psychiatric: interacting appropriately, poor memory





ICD10 Worksheet


Patient Problems: 


 Problems











Problem Status Onset


 


CVA (cerebral vascular accident) Acute

## 2019-03-31 NOTE — ASMTCMCOM
CM Note

 

CM Note                       

Notes:

CM received call from daughter Shey from [facility] asking for status of discharge. CM shared he 

looks to be clinically stable to discharged as planned tomorrow morning. Shey was asking when he 

would be discharged, CM shared we will attempt to discharge as early in the morning and will 

contact [facility] today so they can prepare for discharge to be set up earlier in the morning. 



Please call Shey at [phone] to update with discharge info tomorrow. 



CM contacted John at [facility] [phone], he will not be in tomorrow but [facility] early morning [facility] for the patient. CM to follow. 



D/C Plan: [facility]

 

Date Signed:  03/31/2019 01:22 PM

Electronically Signed By:Ivonne Patricio

## 2019-04-01 VITALS — DIASTOLIC BLOOD PRESSURE: 78 MMHG | SYSTOLIC BLOOD PRESSURE: 141 MMHG

## 2019-04-01 RX ADMIN — CLOPIDOGREL BISULFATE SCH MG: 75 TABLET, FILM COATED ORAL at 10:01

## 2019-04-01 RX ADMIN — ATORVASTATIN CALCIUM SCH MG: 10 TABLET, FILM COATED ORAL at 10:01

## 2019-04-01 RX ADMIN — LISINOPRIL SCH MG: 5 TABLET ORAL at 10:00

## 2019-04-01 RX ADMIN — HEPARIN SODIUM SCH UNIT: 5000 INJECTION, SOLUTION INTRAVENOUS; SUBCUTANEOUS at 05:16

## 2019-04-01 RX ADMIN — METOPROLOL SUCCINATE SCH MG: 25 TABLET, EXTENDED RELEASE ORAL at 10:01

## 2019-04-01 NOTE — ASMTLACE
LACE

 

Length of stay for            Answers:  3 days                                

current admission                                                             

Acuity / Level of             Answers:  Yes                                   

Care: Did the patient                                                         

have an inpatient                                                             

admission?                                                                    

Comorbidities - select        Answers:  Previous myocardial                   

all that apply                          infarction                            

                                        Other                         Notes:  HTN

# of Emergency department     Answers:  1-2                                   

visits in the last 6                                                          

months                                                                        

Score: 9

 

Date Signed:  04/01/2019 10:11 AM

Electronically Signed By:Sarah Lamar RN

## 2019-04-01 NOTE — ASMTDCNOTE
Case Management Discharge

 

Discharge Order Complete?     Answers:  Yes                                   

Patient to Obtain             Answers:  Other                         Notes:  [facility]

Medications                                                                   

Transportation Arranged       Answers:  Other                                 

Transport will Pick (Date     04/01/2019 11:30 AM

& Time)                       

Faxed Final Orders            Answers:  Yes                                   

Family Notified               Answers:  Yes                                   

Discharge Comments            

Notes:

Patient discharged to [facility]. Son Pat notified. Transport arranged by Darwin at [facility]. NIKKI Rodriguez to call report.

 

Date Signed:  04/01/2019 10:20 AM

Electronically Signed By:Sarah Lamar RN

## 2019-04-01 NOTE — GDS
[f rep st]



                                                             DISCHARGE SUMMARY





ALL DIAGNOSES:  

1.  Slurred speech, encephalopathy, possible transient ischemic attack.  

2.  Underlying dementia.  

3.  Hypertension.  

4.  Coronary artery disease, status post myocardial infarction.  

5.  Leg pain and recent femur fracture.



HOSPITAL COURSE:  This is an 87-year-old man with some dementia, who was admitted after some slurred 
speech.  He had full stroke/TIA workup, which was negative.  He had a negative MRI for stroke, negati
ve echocardiogram, and negative CT angiography of the head and neck.  He had no atrial fibrillation o
n telemetry.  He was seen by Neurology, who recommended continuing his Plavix.  We have added a stati
n, as well.  He is overall improving.  It is unclear if this really represents a TIA versus encephalo
marbella in the setting of his known dementia.  We have increased his lisinopril from 5 mg to 20 mg give
n his uncontrolled hypertension.  He will also continue his Lopressor.  He is not ready to return [facility] to independent living.  Thus, he is being discharged to a skilled nursing facility at this time. 



He had some leg pain.  He has a known recent femur fracture that is nonoperative.  X-ray showed nothi
ng acute. 



He does have coronary artery disease; however, he has had no chest pain.  He is on appropriate medica
tions for this.



DISPOSITION:  Discharged to the skilled nursing facility in stable condition.



BILLING:  I spent more than 30 minutes on the day of discharge coordinating care.





Job #:  [id number]

## 2019-04-01 NOTE — PDIAF
- Diagnosis


Diagnosis: TIA, confusion


Code Status: Do Not Resuscitate





- Medication Management


Discharge Medications: electronically signed and located in the Home Medication 

List.





- Orders


Services needed: Registered Nurse, Physical Therapy, Occupational Therapy


Diet Recommendation: no restrictions on diet


Diet Texture: Regular Texture Diet, Thin Liquids, Meds Whole w/Liquids





- Follow Up Care


Current Providers and Referrals: 


NONE *PRIMARY CARE P,. [Primary Care Provider] - As per Instructions

## 2019-04-02 NOTE — ASDISCHSUM
----------------------------------------------

Discharge Information

----------------------------------------------

Plan Status:SNF                                      Medically Cleared to Leave:

Discharge Date:04/01/2019 11:39 AM                   CM D/C Disposition:Skilled Nursing Facility

ADT D/C Disposition:Skilled Nursing Facility         Projected Discharge Date:03/30/2019 11:00 AM

Transportation at D/C:Wheelchair Van                 Discharge Delay Reason:

Follow-Up Date:03/30/2019 11:00 AM                   Discharge Slot:

Final Diagnosis:

----------------------------------------------

Placement Information

----------------------------------------------

Referral Type:*Nursing Home/SNF                      Referral ID:[id number]

Provider Name:[facility]

Address 1:[address]                             Phone [phone]

Address 2:                                           Fax [phone]

City:[place]                                         Selection Factors:

State:[place]

 

----------------------------------------------

Patient Contact Information

----------------------------------------------

Contact Name:DESHAWN MUNOZ                      Relationship:Daughter

Address:[address] [place] [address]                            Home [phone]

                                                     Work [phone]

City:[place]                                         Alternate Phone:

State/Zip Code:[place] [address]                              Email:

----------------------------------------------

Financial Information

----------------------------------------------

Financial Class:Medicare

Primary Plan Desc:MEDICARE INPATIENT                 Primary Plan Number:[id number]

Secondary Plan Desc:[employer] LIFE                  Secondary Plan Number:[id number]

 

 

----------------------------------------------

Assessment Information

----------------------------------------------

----------------------------------------------

LACE

----------------------------------------------

LACE

 

Length of stay for            Answers:  3 days                                

current admission                                                             

Acuity / Level of             Answers:  Yes                                   

Care: Did the patient                                                         

have an inpatient                                                             

admission?                                                                    

Comorbidities - select        Answers:  Previous myocardial                   

all that apply                          infarction                            

                                        Other                         Notes:  HTN

# of Emergency department     Answers:  1-2                                   

visits in the last 6                                                          

months                                                                        

Score: 9

 

Date Signed:  04/01/2019 10:11 AM

Electronically Signed By:Sarah Lamar RN

 

 

----------------------------------------------

[facility] CM Progress Note

----------------------------------------------

CM Note

 

CM Note                       

Notes:

Chart Review for Discharge Planning: 



Patient is an 87 year old male who presented to [facility] ED reporting leg pain and presented with 

difficulty speaking/slurred speech and aphasia.  Patient presented to ED with Grand 

daughter. Medical history includes dementia, hypertension, Myocardial infarction, recent femur 

fracture. Patient currently lives at [facility]. 



 OT ordered, recommending shelter/home with 24 hour supervision, PT ordered & recommending SNF, SLP 

ordered & recommending memory care. 

 

CM call to Grand daughter Tiffany, [phone], she shares he lives at [facility] and has a caregiver 


that visits 8-10 daily and he also attends adult day services M-F-F. He has PT with [facility] on 

Thursdays.  She shares she would be open to him staying with her when he discharges but is not 

around very much and is very busy. Tiffany states she does not know much about her Grandfathers 

medical history & recommended calling her mom, Sehy on her dad's cell phone [phone], who is 

currently in [facility] on a trip. 



CM spoke with daughter Shey, CM shared recommendations for SNF, she states he doesn't have history 

of going to a SNF and though she agrees and thinks it is a good plan, she does not think he will 

agree and will want to go home. Shey shares she is planning on getting him into an Assisted Living 

Facility when she returns from [facility] in a month. She states she would like to speak with his MD 


about the Neuro consult and imaging results. 



[facility], [phone]. MONIQUE stating we are monitoring patient 

and will send updates as we have them. 



*Jagjit Kat in [facility] for one month, can be reached at [phone].



Plan: Neuro recommended MRI of brain and TTE. CM to follow. 



D/C Plan: TBD

 

Date Signed:  03/28/2019 04:58 PM

Electronically Signed By:Ivonne Patricio

 

 

----------------------------------------------

[facility] CM Progress Note

----------------------------------------------

CM Note

 

CM Note                       

Notes:

Spoke with pt's daughter Shey (MDPOA) who is currently in [place] and will soon be out of phone 

range. She will be gone for a few weeks. Her [facility] [phone] 

and should be contacted for medical decisions. Spoke with both of them regarding therapy 

recommendation for a short term rehab stay at d/c. They are both in agreement with the plan 

although not convinced it will benefit their father. Referrals were sent to [facility] (declined pt 

- concerned about him frequently getting up and down), [facility] and [facility] accepted. Earliest 

d/c would be 4/1 as pt was just changed from obs status to inpt today. Also spoke with Beulah the 


Exec Director at [facility] and they are concerned about him returning there 2/2 being a high 

fall risk. It is independent living and pt is currently have difficulty getting around. CM will 

continue to follow. 



D/C plan: TBD, possibly SNF

 

Date Signed:  03/29/2019 03:26 PM

Electronically Signed By:JESSE Donohue

 

 

----------------------------------------------

[facility] MAICO Progress Note

----------------------------------------------

CM Note

 

CM Note                       

Notes:

CM spoke with pt's daughter and son.  Both are supportive of d/c to SNF; pt's tamiko requested 

[facility].  Will inform [facility] and ask them to run auth.



D/C Plan:  [facility]

 

Date Signed:  03/30/2019 11:02 AM

Electronically Signed By:Kareen Burgess

 

 

----------------------------------------------

[facility] MAICO Progress Note

----------------------------------------------

CM Note

 

CM Note                       

Notes:

MAICO received call from daughter Shey from [facility] asking for status of discharge. CM shared he 

looks to be clinically stable to discharged as planned tomorrow morning. Shey was asking when he 

would be discharged, CM shared we will attempt to discharge as early in the morning and will 

contact [facility] today so they can prepare for discharge to be set up earlier in the morning. 



Please call Shey at [phone] to update with discharge info tomorrow. 



MAICO contacted John at [facility] [phone], he will not be in tomorrow but will let Darwin to 


expect to arrange early morning transport for the patient. MAICO to follow. 



D/C Plan: [facility]

 

Date Signed:  03/31/2019 01:22 PM

Electronically Signed By:Ivonne Patricio

 

 

----------------------------------------------

Case Management Discharge Plan Note

----------------------------------------------

Case Management Discharge

 

Discharge Order Complete?     Answers:  Yes                                   

Patient to Obtain             Answers:  Other                         Notes:  [facility]

Medications                                                                   

Transportation Arranged       Answers:  Other                                 

Transport will Pick (Date     04/01/2019 11:30 AM

& Time)                       

Faxed Final Orders            Answers:  Yes                                   

Family Notified               Answers:  Yes                                   

Discharge Comments            

Notes:

Patient discharged to [facility]. Son Mariposa notified. Transport arranged by Darwin at [facility]. NIKKI Rodriguez to call report.

 

Date Signed:  04/01/2019 10:20 AM

Electronically Signed By:Sarah Lamar RN

 

 

----------------------------------------------

Intervention Information

----------------------------------------------

Intervention Type:*Incorrect Registration            Date of Service:03/27/2019 07:45 AM

Patient Type:Observation                             Staff Member:NIKKI Farr, Korina

Hours:                                               Discipline:

Severity:                                            Comment:

Intervention Type:*MOON-Signed                       Date of Service:03/28/2019 11:38 AM

Patient Type:Observation                             Staff Member:Era Ghosh

Hours:                                               Discipline:

Severity:                                            Comment:

Intervention Type:IM-Refused                         Date of Service:04/01/2019 10:45 AM

Patient Type:Inpatient                               Staff Member:Era Ghosh

Hours:                                               Discipline:

Severity:                                            Comment:Patient read the Medicare Important No
tien and 

                                                              decided he did not feel comfortable 

                                                              signing the form.

## 2019-06-15 ENCOUNTER — HOSPITAL ENCOUNTER (EMERGENCY)
Dept: HOSPITAL 80 - FED | Age: 84
Discharge: HOME | End: 2019-06-15
Payer: COMMERCIAL